# Patient Record
Sex: FEMALE | Race: WHITE | NOT HISPANIC OR LATINO | Employment: OTHER | URBAN - METROPOLITAN AREA
[De-identification: names, ages, dates, MRNs, and addresses within clinical notes are randomized per-mention and may not be internally consistent; named-entity substitution may affect disease eponyms.]

---

## 2017-11-03 ENCOUNTER — ESTABLISHED COMPREHENSIVE EXAM (OUTPATIENT)
Dept: URBAN - METROPOLITAN AREA CLINIC 39 | Facility: CLINIC | Age: 77
End: 2017-11-03

## 2017-11-03 DIAGNOSIS — H25.813: ICD-10-CM

## 2017-11-03 DIAGNOSIS — H10.13: ICD-10-CM

## 2017-11-03 DIAGNOSIS — H43.813: ICD-10-CM

## 2017-11-03 PROCEDURE — G8427 DOCREV CUR MEDS BY ELIG CLIN: HCPCS

## 2017-11-03 PROCEDURE — 92015 DETERMINE REFRACTIVE STATE: CPT

## 2017-11-03 PROCEDURE — 1036F TOBACCO NON-USER: CPT

## 2017-11-03 PROCEDURE — 92014 COMPRE OPH EXAM EST PT 1/>: CPT

## 2017-11-03 RX ORDER — IBUPROFEN 600 MG/1: 1 TABLET, FILM COATED ORAL EVERY MORNING

## 2017-11-03 ASSESSMENT — VISUAL ACUITY
OD_CC: J2
OD_SC: 20/60-2
OD_CC: 20/50
OS_SC: J12
OD_BAT: 20/400
OS_SC: 20/40-2
OS_BAT: 20/400
OS_CC: J2
OD_SC: J12
OS_CC: 20/30-2

## 2017-11-03 ASSESSMENT — TONOMETRY
OD_IOP_MMHG: 14
OS_IOP_MMHG: 15

## 2018-11-09 ENCOUNTER — ESTABLISHED COMPREHENSIVE EXAM (OUTPATIENT)
Dept: URBAN - METROPOLITAN AREA CLINIC 39 | Facility: CLINIC | Age: 78
End: 2018-11-09

## 2018-11-09 DIAGNOSIS — H25.813: ICD-10-CM

## 2018-11-09 DIAGNOSIS — H43.813: ICD-10-CM

## 2018-11-09 DIAGNOSIS — H04.123: ICD-10-CM

## 2018-11-09 DIAGNOSIS — H10.13: ICD-10-CM

## 2018-11-09 PROCEDURE — 92014 COMPRE OPH EXAM EST PT 1/>: CPT

## 2018-11-09 PROCEDURE — G8756 NO BP MEASURE DOC: HCPCS

## 2018-11-09 PROCEDURE — G8427 DOCREV CUR MEDS BY ELIG CLIN: HCPCS

## 2018-11-09 PROCEDURE — 1036F TOBACCO NON-USER: CPT

## 2018-11-09 PROCEDURE — 92015 DETERMINE REFRACTIVE STATE: CPT

## 2018-11-09 PROCEDURE — G9903 PT SCRN TBCO ID AS NON USER: HCPCS

## 2018-11-09 ASSESSMENT — VISUAL ACUITY
OD_SC: J8
OS_BAT: <20/400 W/ MR
OD_BAT: <20/400 W/ MR
OS_SC: J8
OS_CC: 20/25
OD_CC: 20/40-1
OS_SC: 20/30
OD_CC: J2
OD_SC: 20/30
OS_CC: J1

## 2018-11-09 ASSESSMENT — TONOMETRY
OS_IOP_MMHG: 12
OD_IOP_MMHG: 12

## 2018-12-03 ENCOUNTER — CATARACT CONSULT (OUTPATIENT)
Dept: URBAN - METROPOLITAN AREA CLINIC 39 | Facility: CLINIC | Age: 78
End: 2018-12-03

## 2018-12-03 VITALS
HEART RATE: 80 BPM | RESPIRATION RATE: 13 BRPM | SYSTOLIC BLOOD PRESSURE: 164 MMHG | DIASTOLIC BLOOD PRESSURE: 75 MMHG | HEIGHT: 55 IN

## 2018-12-03 DIAGNOSIS — H43.813: ICD-10-CM

## 2018-12-03 DIAGNOSIS — H25.812: ICD-10-CM

## 2018-12-03 DIAGNOSIS — H04.123: ICD-10-CM

## 2018-12-03 DIAGNOSIS — H25.811: ICD-10-CM

## 2018-12-03 DIAGNOSIS — H18.51: ICD-10-CM

## 2018-12-03 DIAGNOSIS — Z79.899: ICD-10-CM

## 2018-12-03 DIAGNOSIS — H35.3131: ICD-10-CM

## 2018-12-03 PROCEDURE — 1036F TOBACCO NON-USER: CPT

## 2018-12-03 PROCEDURE — 92025-2 CORNEAL TOPOGRAPHY, PT

## 2018-12-03 PROCEDURE — G8482 FLU IMMUNIZE ORDER/ADMIN: HCPCS

## 2018-12-03 PROCEDURE — 92286 ANT SGM IMG I&R SPECLR MIC: CPT

## 2018-12-03 PROCEDURE — 4040F PNEUMOC VAC/ADMIN/RCVD: CPT

## 2018-12-03 PROCEDURE — G8952 PRE-HTN/HTN, NO F/U, NOT GVN: HCPCS

## 2018-12-03 PROCEDURE — G8427 DOCREV CUR MEDS BY ELIG CLIN: HCPCS

## 2018-12-03 PROCEDURE — 92134 CPTRZ OPH DX IMG PST SGM RTA: CPT

## 2018-12-03 PROCEDURE — V2799I IMPRIMIS

## 2018-12-03 PROCEDURE — G9903 PT SCRN TBCO ID AS NON USER: HCPCS

## 2018-12-03 PROCEDURE — 99214 OFFICE O/P EST MOD 30 MIN: CPT

## 2018-12-03 PROCEDURE — 92136TC INTERFEROMETRY - TECHNICAL COMPONENT

## 2018-12-03 ASSESSMENT — VISUAL ACUITY
OD_BAT: <20/400
OD_CC: J3
OD_CC: 20/40-1
OS_SC: J10
OD_PAM: 20/20
OD_SC: J10
OS_CC: 20/25
OS_SC: 20/40-2
OS_AM: 20/20
OS_CC: J2
OD_SC: 20/50-2
OS_BAT: <20/400

## 2018-12-03 ASSESSMENT — TONOMETRY
OS_IOP_MMHG: 13
OD_IOP_MMHG: 13

## 2019-01-09 ENCOUNTER — TECH ONLY (OUTPATIENT)
Dept: URBAN - METROPOLITAN AREA CLINIC 39 | Facility: CLINIC | Age: 79
End: 2019-01-09

## 2019-01-09 DIAGNOSIS — H25.811: ICD-10-CM

## 2019-01-09 DIAGNOSIS — H25.812: ICD-10-CM

## 2019-01-09 PROCEDURE — 92025NC COMP. CORNEAL TOPO, UNI OR BILAT,

## 2019-01-09 PROCEDURE — 92136TCNC INTERFEROMETRY -TECHNICAL COMPONENT NO CHARGE

## 2019-01-15 ENCOUNTER — SURGERY/PROCEDURE (OUTPATIENT)
Dept: URBAN - METROPOLITAN AREA CLINIC 39 | Facility: CLINIC | Age: 79
End: 2019-01-15

## 2019-01-15 ENCOUNTER — PRE-OP/H&P (OUTPATIENT)
Dept: URBAN - METROPOLITAN AREA CLINIC 39 | Facility: CLINIC | Age: 79
End: 2019-01-15

## 2019-01-15 VITALS
HEART RATE: 80 BPM | RESPIRATION RATE: 13 BRPM | SYSTOLIC BLOOD PRESSURE: 164 MMHG | HEIGHT: 55 IN | DIASTOLIC BLOOD PRESSURE: 75 MMHG

## 2019-01-15 DIAGNOSIS — H04.123: ICD-10-CM

## 2019-01-15 DIAGNOSIS — H18.51: ICD-10-CM

## 2019-01-15 DIAGNOSIS — H25.811: ICD-10-CM

## 2019-01-15 DIAGNOSIS — H43.813: ICD-10-CM

## 2019-01-15 DIAGNOSIS — H25.812: ICD-10-CM

## 2019-01-15 DIAGNOSIS — H35.3131: ICD-10-CM

## 2019-01-15 DIAGNOSIS — Z79.899: ICD-10-CM

## 2019-01-15 PROCEDURE — G8482 FLU IMMUNIZE ORDER/ADMIN: HCPCS

## 2019-01-15 PROCEDURE — 2019F DILATED MACUL EXAM DONE: CPT

## 2019-01-15 PROCEDURE — G8952 PRE-HTN/HTN, NO F/U, NOT GVN: HCPCS

## 2019-01-15 PROCEDURE — G8418 CALC BMI BLW LOW PARAM F/U: HCPCS

## 2019-01-15 PROCEDURE — G9974 MAC EXAM PERF: HCPCS

## 2019-01-15 PROCEDURE — G9903 PT SCRN TBCO ID AS NON USER: HCPCS

## 2019-01-15 PROCEDURE — 1036F TOBACCO NON-USER: CPT

## 2019-01-15 PROCEDURE — 66984AV REMOVE CATARACT, INSERT ADVANCED LENS

## 2019-01-15 PROCEDURE — 4177F TALK PT/CRGVR RE AREDS PREV: CPT

## 2019-01-15 PROCEDURE — 66999LNSR LENSAR LASER FOR CAT SX

## 2019-01-15 PROCEDURE — G8427 DOCREV CUR MEDS BY ELIG CLIN: HCPCS

## 2019-01-15 PROCEDURE — 99211T TECH SERVICE

## 2019-01-15 PROCEDURE — 4040F PNEUMOC VAC/ADMIN/RCVD: CPT

## 2019-01-16 ENCOUNTER — POST OP/EVAL OF SECOND EYE (OUTPATIENT)
Dept: URBAN - METROPOLITAN AREA CLINIC 39 | Facility: CLINIC | Age: 79
End: 2019-01-16

## 2019-01-16 DIAGNOSIS — H25.812: ICD-10-CM

## 2019-01-16 DIAGNOSIS — Z96.1: ICD-10-CM

## 2019-01-16 PROCEDURE — G8427 DOCREV CUR MEDS BY ELIG CLIN: HCPCS

## 2019-01-16 PROCEDURE — G9903 PT SCRN TBCO ID AS NON USER: HCPCS

## 2019-01-16 PROCEDURE — 1036F TOBACCO NON-USER: CPT

## 2019-01-16 PROCEDURE — 99024 POSTOP FOLLOW-UP VISIT: CPT

## 2019-01-16 PROCEDURE — 92012 INTRM OPH EXAM EST PATIENT: CPT

## 2019-01-16 ASSESSMENT — VISUAL ACUITY
OD_SC: J3
OS_SC: J10
OD_SC: 20/20-1
OS_SC: 20/40

## 2019-01-16 ASSESSMENT — TONOMETRY
OD_IOP_MMHG: 13
OS_IOP_MMHG: 13

## 2019-01-22 ENCOUNTER — PREPPED CHART (OUTPATIENT)
Dept: URBAN - METROPOLITAN AREA CLINIC 39 | Facility: CLINIC | Age: 79
End: 2019-01-22

## 2019-01-22 ENCOUNTER — SURGERY/PROCEDURE (OUTPATIENT)
Dept: URBAN - METROPOLITAN AREA CLINIC 39 | Facility: CLINIC | Age: 79
End: 2019-01-22

## 2019-01-22 VITALS
RESPIRATION RATE: 13 BRPM | HEART RATE: 80 BPM | SYSTOLIC BLOOD PRESSURE: 164 MMHG | HEIGHT: 55 IN | DIASTOLIC BLOOD PRESSURE: 75 MMHG

## 2019-01-22 DIAGNOSIS — H25.812: ICD-10-CM

## 2019-01-22 DIAGNOSIS — Z96.1: ICD-10-CM

## 2019-01-22 PROCEDURE — G8427 DOCREV CUR MEDS BY ELIG CLIN: HCPCS

## 2019-01-22 PROCEDURE — G8482 FLU IMMUNIZE ORDER/ADMIN: HCPCS

## 2019-01-22 PROCEDURE — 65772LRI LRI DURING CAT SX

## 2019-01-22 PROCEDURE — 4040F PNEUMOC VAC/ADMIN/RCVD: CPT

## 2019-01-22 PROCEDURE — 99211T TECH SERVICE

## 2019-01-22 PROCEDURE — G8418 CALC BMI BLW LOW PARAM F/U: HCPCS

## 2019-01-22 PROCEDURE — G8952 PRE-HTN/HTN, NO F/U, NOT GVN: HCPCS

## 2019-01-22 PROCEDURE — 66999LNSR LENSAR LASER FOR CAT SX

## 2019-01-22 PROCEDURE — G9903 PT SCRN TBCO ID AS NON USER: HCPCS

## 2019-01-22 PROCEDURE — 66984AV REMOVE CATARACT, INSERT ADVANCED LENS

## 2019-01-22 PROCEDURE — 1036F TOBACCO NON-USER: CPT

## 2019-01-22 ASSESSMENT — TONOMETRY
OD_IOP_MMHG: 14
OS_IOP_MMHG: 14

## 2019-01-22 ASSESSMENT — VISUAL ACUITY
OS_SC: J10
OD_SC: J8
OD_SC: 20/25-1
OS_SC: 20/40-1

## 2019-01-23 ENCOUNTER — CATARACT POST-OP 1-DAY (OUTPATIENT)
Dept: URBAN - METROPOLITAN AREA CLINIC 39 | Facility: CLINIC | Age: 79
End: 2019-01-23

## 2019-01-23 DIAGNOSIS — Z96.1: ICD-10-CM

## 2019-01-23 PROCEDURE — 99024 POSTOP FOLLOW-UP VISIT: CPT

## 2019-01-23 ASSESSMENT — TONOMETRY
OS_IOP_MMHG: 10
OD_IOP_MMHG: 10

## 2019-01-23 ASSESSMENT — VISUAL ACUITY
OS_SC: J1-
OU_SC: 20/20
OD_SC: J2
OU_SC: J1-
OD_SC: 20/20
OS_SC: 20/20

## 2019-02-22 ENCOUNTER — POST-OP CATARACT (OUTPATIENT)
Dept: URBAN - METROPOLITAN AREA CLINIC 39 | Facility: CLINIC | Age: 79
End: 2019-02-22

## 2019-02-22 DIAGNOSIS — Z96.1: ICD-10-CM

## 2019-02-22 PROCEDURE — 99024 POSTOP FOLLOW-UP VISIT: CPT

## 2019-02-22 ASSESSMENT — VISUAL ACUITY
OU_SC: 20/20-2
OS_SC: J2
OD_SC: J3
OU_SC: J1
OS_SC: 20/25
OD_SC: 20/25-1

## 2019-06-03 ENCOUNTER — ESTABLISHED COMPREHENSIVE EXAM (OUTPATIENT)
Dept: URBAN - METROPOLITAN AREA CLINIC 39 | Facility: CLINIC | Age: 79
End: 2019-06-03

## 2019-06-03 DIAGNOSIS — H04.123: ICD-10-CM

## 2019-06-03 DIAGNOSIS — H18.51: ICD-10-CM

## 2019-06-03 DIAGNOSIS — Z79.899: ICD-10-CM

## 2019-06-03 DIAGNOSIS — Z96.1: ICD-10-CM

## 2019-06-03 DIAGNOSIS — H43.813: ICD-10-CM

## 2019-06-03 DIAGNOSIS — H26.493: ICD-10-CM

## 2019-06-03 PROCEDURE — 92014 COMPRE OPH EXAM EST PT 1/>: CPT

## 2019-06-03 PROCEDURE — 92015 DETERMINE REFRACTIVE STATE: CPT

## 2019-06-03 ASSESSMENT — TONOMETRY
OS_IOP_MMHG: 12
OD_IOP_MMHG: 12

## 2019-06-03 ASSESSMENT — VISUAL ACUITY
OD_SC: 20/25-1
OS_SC: J1
OD_SC: J6
OS_SC: 20/25+2

## 2019-06-11 ENCOUNTER — CONSULT (OUTPATIENT)
Dept: URBAN - METROPOLITAN AREA CLINIC 39 | Facility: CLINIC | Age: 79
End: 2019-06-11

## 2019-06-11 ENCOUNTER — SURGERY/PROCEDURE (OUTPATIENT)
Dept: URBAN - METROPOLITAN AREA SURGERY 14 | Facility: SURGERY | Age: 79
End: 2019-06-11

## 2019-06-11 DIAGNOSIS — H26.493: ICD-10-CM

## 2019-06-11 PROCEDURE — 6682150 YAG CAPSULOTOMY

## 2019-06-11 PROCEDURE — 92014 COMPRE OPH EXAM EST PT 1/>: CPT

## 2019-06-11 ASSESSMENT — VISUAL ACUITY
OS_SC: 20/30-1
OD_SC: 20/40
OS_BAT: 20/50
OD_BAT: 20/50

## 2019-06-11 ASSESSMENT — TONOMETRY
OD_IOP_MMHG: 12
OS_IOP_MMHG: 12

## 2019-06-19 ENCOUNTER — YAG POST-OP (OUTPATIENT)
Dept: URBAN - METROPOLITAN AREA CLINIC 39 | Facility: CLINIC | Age: 79
End: 2019-06-19

## 2019-06-19 DIAGNOSIS — Z98.890: ICD-10-CM

## 2019-06-19 PROCEDURE — 99024 POSTOP FOLLOW-UP VISIT: CPT

## 2019-06-19 ASSESSMENT — VISUAL ACUITY
OU_SC: 20/20
OU_SC: J1
OD_SC: 20/25+1
OD_SC: J2
OS_SC: J1
OS_SC: 20/20

## 2019-06-19 ASSESSMENT — TONOMETRY
OD_IOP_MMHG: 11
OS_IOP_MMHG: 12

## 2019-11-14 ENCOUNTER — ESTABLISHED COMPREHENSIVE EXAM (OUTPATIENT)
Dept: URBAN - METROPOLITAN AREA CLINIC 39 | Facility: CLINIC | Age: 79
End: 2019-11-14

## 2019-11-14 DIAGNOSIS — H04.123: ICD-10-CM

## 2019-11-14 DIAGNOSIS — H43.813: ICD-10-CM

## 2019-11-14 DIAGNOSIS — H18.51: ICD-10-CM

## 2019-11-14 DIAGNOSIS — Z79.899: ICD-10-CM

## 2019-11-14 PROCEDURE — 92015 DETERMINE REFRACTIVE STATE: CPT

## 2019-11-14 PROCEDURE — 92014 COMPRE OPH EXAM EST PT 1/>: CPT

## 2019-11-14 ASSESSMENT — TONOMETRY
OD_IOP_MMHG: 8
OS_IOP_MMHG: 8

## 2019-11-14 ASSESSMENT — VISUAL ACUITY
OD_SC: J2
OD_SC: 20/25-
OS_SC: 20/25-1
OU_SC: J1
OS_SC: J1
OU_SC: 20/25+2

## 2020-05-21 NOTE — PATIENT DISCUSSION
Discussed importance of compliance with ocular medications and follow up exams to prevent loss of vision. Pt. to return 1-2 weeks for visual field and further evaluation as needed.

## 2020-06-22 ENCOUNTER — ESTABLISHED COMPREHENSIVE EXAM (OUTPATIENT)
Dept: URBAN - METROPOLITAN AREA CLINIC 39 | Facility: CLINIC | Age: 80
End: 2020-06-22

## 2020-06-22 DIAGNOSIS — H43.813: ICD-10-CM

## 2020-06-22 DIAGNOSIS — H04.123: ICD-10-CM

## 2020-06-22 DIAGNOSIS — H18.51: ICD-10-CM

## 2020-06-22 PROCEDURE — 92014 COMPRE OPH EXAM EST PT 1/>: CPT

## 2020-06-22 PROCEDURE — 92015 DETERMINE REFRACTIVE STATE: CPT

## 2020-06-22 ASSESSMENT — TONOMETRY
OS_IOP_MMHG: 13
OD_IOP_MMHG: 13

## 2020-06-22 ASSESSMENT — VISUAL ACUITY
OS_SC: 20/25+1
OD_SC: 20/25-1
OS_SC: J2
OD_CC: J1
OS_CC: J1
OD_SC: J6

## 2020-07-02 NOTE — PATIENT DISCUSSION
Pt was on Brimonidine, Dorzolamide, and Latanoprost in the past, but hasn't used them in about a year due to lack of insurance.

## 2021-06-21 ENCOUNTER — ESTABLISHED COMPREHENSIVE EXAM (OUTPATIENT)
Dept: URBAN - METROPOLITAN AREA CLINIC 39 | Facility: CLINIC | Age: 81
End: 2021-06-21

## 2021-06-21 DIAGNOSIS — H04.123: ICD-10-CM

## 2021-06-21 DIAGNOSIS — Z96.1: ICD-10-CM

## 2021-06-21 DIAGNOSIS — H43.813: ICD-10-CM

## 2021-06-21 DIAGNOSIS — H18.513: ICD-10-CM

## 2021-06-21 PROCEDURE — 92014 COMPRE OPH EXAM EST PT 1/>: CPT

## 2021-06-21 ASSESSMENT — VISUAL ACUITY
OD_SC: J3
OD_SC: 20/25
OS_SC: 20/25
OS_CC: J1
OS_SC: J2
OD_CC: J1

## 2021-06-21 ASSESSMENT — TONOMETRY
OD_IOP_MMHG: 13
OS_IOP_MMHG: 13

## 2022-06-24 ENCOUNTER — COMPREHENSIVE EXAM (OUTPATIENT)
Dept: URBAN - METROPOLITAN AREA CLINIC 39 | Facility: CLINIC | Age: 82
End: 2022-06-24

## 2022-06-24 DIAGNOSIS — H43.813: ICD-10-CM

## 2022-06-24 DIAGNOSIS — H10.13: ICD-10-CM

## 2022-06-24 DIAGNOSIS — Z96.1: ICD-10-CM

## 2022-06-24 DIAGNOSIS — H18.513: ICD-10-CM

## 2022-06-24 DIAGNOSIS — H04.123: ICD-10-CM

## 2022-06-24 PROCEDURE — 92014 COMPRE OPH EXAM EST PT 1/>: CPT

## 2022-06-24 PROCEDURE — 92015 DETERMINE REFRACTIVE STATE: CPT

## 2022-06-24 RX ORDER — ALCAFTADINE 2.5 MG/ML
1 SOLUTION/ DROPS OPHTHALMIC ONCE A DAY
Stop reason: ALTCHOICE

## 2022-06-24 ASSESSMENT — VISUAL ACUITY
OS_SC: J2
OD_CC: J1+
OD_SC: J2
OU_SC: 20/20-2
OU_CC: J1+
OD_SC: 20/25
OS_SC: 20/40+2
OS_CC: J1+
OU_SC: J2

## 2022-06-24 ASSESSMENT — TONOMETRY
OS_IOP_MMHG: 14
OD_IOP_MMHG: 14